# Patient Record
Sex: FEMALE | Race: WHITE | Employment: OTHER | URBAN - METROPOLITAN AREA
[De-identification: names, ages, dates, MRNs, and addresses within clinical notes are randomized per-mention and may not be internally consistent; named-entity substitution may affect disease eponyms.]

---

## 2017-02-20 ENCOUNTER — OFFICE VISIT (OUTPATIENT)
Dept: UROLOGY | Facility: CLINIC | Age: 82
End: 2017-02-20
Payer: COMMERCIAL

## 2017-02-20 DIAGNOSIS — Z85.51 PERSONAL HISTORY OF MALIGNANT NEOPLASM OF BLADDER: Primary | ICD-10-CM

## 2017-02-20 PROCEDURE — 99207 ZZC DROP WITH A PROCEDURE: CPT | Mod: 25 | Performed by: UROLOGY

## 2017-02-20 PROCEDURE — 52000 CYSTOURETHROSCOPY: CPT | Performed by: UROLOGY

## 2017-02-20 NOTE — PROGRESS NOTES
S: Cathy Early is a 81 year old female returns for bladder cancer surveillance.    she has history of bladder cancer Grade 1 non-invasive, Stage Ta, s/p turbt on 3/ 15 and 12/15   She is also s/p botox in the past.    She received 0 courses of BCG therapy.    Patient is draped and prepped.  Flexible cystoscopy placed under direct vision.      Cysto:  The anterior urethra is normal     In the bladder there is normal mucosa.    Assessment/Plan:  (V10.51) Personal history of malignant neoplasm of bladder  (primary encounter diagnosis)  Comment: no cancer recurrence  Plan: Cystoscopy         In one year or prn given her declining health.

## 2017-02-20 NOTE — MR AVS SNAPSHOT
"              After Visit Summary   2017    Cathy Early    MRN: [id number]           Patient Information     Date Of Birth          1935        Visit Information        Provider Department      2017 10:45 AM José Miguel Schultz MD; SUE CYSTO PROC ROOM [facility]        Today's Diagnoses     Personal history of malignant neoplasm of bladder    -  1       Follow-ups after your visit        Who to contact     If you have questions or need follow up information about today's clinic visit or your schedule please [facility] at [phone].  Normal or non-critical lab and imaging results will be communicated to you by MyChart, letter or phone within 4 business days after the clinic has received the results. If you do not hear from us within 7 days, please contact the clinic through [employer] or phone. If you have a critical or abnormal lab result, we will notify you by phone as soon as possible.  Submit refill requests through [employer] or call your pharmacy and they will forward the refill request to us. Please allow 3 business days for your refill to be completed.          Additional Information About Your Visit        MyChart Information     [employer] lets you send messages to your doctor, view your test results, renew your prescriptions, schedule appointments and more. To sign up, go to [email or web address] . Click on \"Log in\" on the left side of the screen, which will take you to the Welcome page. Then click on \"Sign up Now\" on the right side of the page.     You will be asked to enter the access code listed below, as well as some personal information. Please follow the directions to create your username and password.     Your access code is: [email or web address]  Expires: 2017 11:18 AM     Your access code will  in 90 days. If you need help or a new code, please call your [facility] or [phone].        Care EveryWhere ID     This is " your Care EveryWhere ID. This could be used by other organizations to access your [place] medical records  [id number]         Blood Pressure from Last 3 Encounters:   08/22/16 128/72   01/25/16 122/60   01/06/16 126/70    Weight from Last 3 Encounters:   01/06/16 76.8 kg (169 lb 4.8 oz)   12/03/15 74.1 kg (163 lb 6.4 oz)   08/20/15 70.1 kg (154 lb 9.6 oz)              We Performed the Following     CYSTOURETHROSCOPY        Primary Care Provider Office Phone # Fax #    MARYAN West [phone]       [facility] [address] [address]        Thank you!     Thank you for choosing [facility]  for your care. Our goal is always to provide you with excellent care. Hearing back from our patients is one way we can continue to improve our services. Please take a few minutes to complete the written survey that you may receive in the mail after your visit with us. Thank you!             Your Updated Medication List - Protect others around you: Learn how to safely use, store and throw away your medicines at www.disposemymeds.org.          This list is accurate as of: 2/20/17 11:18 AM.  Always use your most recent med list.                   Brand Name Dispense Instructions for use    botulinum toxin type A 100 UNITS injection    BOTOX     Inject into the muscle once       budesonide-formoterol 80-4.5 MCG/ACT Inhaler    SYMBICORT    1 Inhaler    Inhale 2 puffs into the lungs 2 times daily       CALCIUM-VITAMIN D PO      Take 2 tablets by mouth daily       donepezil 10 MG tablet    ARICEPT    30 tablet    Take 1 tablet (10 mg) by mouth At Bedtime MUST FIND NEW PRESCRIBER       fexofenadine 180 MG tablet    ALLEGRA    30 tablet    Take 1 tablet (180 mg) by mouth daily as needed       hydrochlorothiazide 12.5 MG capsule    MICROZIDE    60 capsule    Take 1 capsule (12.5 mg) by mouth every morning       LACTAID PO      Take by mouth 3 times  daily as needed for indigestion       LANsoprazole 15 MG CR capsule    PREVACID    120 capsule    Take 2 capsules (30 mg) by mouth daily Take 30-60 minutes before a meal.       lisinopril 5 MG tablet    PRINIVIL/ZESTRIL    30 tablet    Take 1 tablet (5 mg) by mouth daily OK to refill today. Need to have future refills from primary MD.       NAMENDA PO      Take 5 mg by mouth 2 times daily       NORVASC PO      Take 5 mg by mouth 2 times daily as needed       sertraline 25 MG tablet    ZOLOFT    270 tablet    Take 3 tablets (75 mg) by mouth daily Take 3 tablets by mouth daily.       trospium 20 MG tablet    SANCTURA    60 tablet    Take 1 tablet (20 mg) by mouth 2 times daily (before meals)

## 2017-04-11 ENCOUNTER — RECORDS - HEALTHEAST (OUTPATIENT)
Dept: LAB | Facility: CLINIC | Age: 82
End: 2017-04-11

## 2017-04-11 LAB
CREAT SERPL-MCNC: 0.94 MG/DL (ref 0.6–1.1)
GFR SERPL CREATININE-BSD FRML MDRD: 57 ML/MIN/1.73M2

## 2018-02-12 ENCOUNTER — RECORDS - HEALTHEAST (OUTPATIENT)
Dept: LAB | Facility: CLINIC | Age: 83
End: 2018-02-12

## 2018-02-12 LAB
FLUAV AG SPEC QL IA: NORMAL
FLUBV AG SPEC QL IA: NORMAL

## 2018-08-10 ENCOUNTER — RECORDS - HEALTHEAST (OUTPATIENT)
Dept: LAB | Facility: CLINIC | Age: 83
End: 2018-08-10

## 2018-08-10 LAB
CREAT SERPL-MCNC: 0.59 MG/DL (ref 0.6–1.1)
GFR SERPL CREATININE-BSD FRML MDRD: >60 ML/MIN/1.73M2
POTASSIUM BLD-SCNC: 5.4 MMOL/L (ref 3.5–5)